# Patient Record
Sex: MALE | Race: BLACK OR AFRICAN AMERICAN | HISPANIC OR LATINO
[De-identification: names, ages, dates, MRNs, and addresses within clinical notes are randomized per-mention and may not be internally consistent; named-entity substitution may affect disease eponyms.]

---

## 2023-02-28 PROBLEM — Z00.00 ENCOUNTER FOR PREVENTIVE HEALTH EXAMINATION: Status: ACTIVE | Noted: 2023-02-28

## 2023-05-05 ENCOUNTER — LABORATORY RESULT (OUTPATIENT)
Age: 43
End: 2023-05-05

## 2023-05-05 ENCOUNTER — APPOINTMENT (OUTPATIENT)
Dept: PULMONOLOGY | Facility: CLINIC | Age: 43
End: 2023-05-05
Payer: MEDICAID

## 2023-05-05 VITALS
BODY MASS INDEX: 26.77 KG/M2 | SYSTOLIC BLOOD PRESSURE: 141 MMHG | DIASTOLIC BLOOD PRESSURE: 92 MMHG | WEIGHT: 187 LBS | HEART RATE: 95 BPM | HEIGHT: 70 IN | OXYGEN SATURATION: 97 %

## 2023-05-05 DIAGNOSIS — G47.9 SLEEP DISORDER, UNSPECIFIED: ICD-10-CM

## 2023-05-05 PROCEDURE — ZZZZZ: CPT

## 2023-05-05 PROCEDURE — 94729 DIFFUSING CAPACITY: CPT

## 2023-05-05 PROCEDURE — 94726 PLETHYSMOGRAPHY LUNG VOLUMES: CPT

## 2023-05-05 PROCEDURE — 94010 BREATHING CAPACITY TEST: CPT

## 2023-05-05 PROCEDURE — 99204 OFFICE O/P NEW MOD 45 MIN: CPT | Mod: 25

## 2023-05-05 RX ORDER — ALBUTEROL SULFATE 90 UG/1
108 (90 BASE) INHALANT RESPIRATORY (INHALATION) EVERY 4 HOURS
Qty: 1 | Refills: 1 | Status: ACTIVE | COMMUNITY
Start: 2023-05-05 | End: 1900-01-01

## 2023-05-05 RX ORDER — CETIRIZINE HYDROCHLORIDE 5 MG/1
5 TABLET ORAL DAILY
Qty: 30 | Refills: 0 | Status: ACTIVE | COMMUNITY
Start: 2023-05-05 | End: 1900-01-01

## 2023-05-05 RX ORDER — FLUTICASONE PROPIONATE 50 UG/1
50 SPRAY, METERED NASAL DAILY
Qty: 1 | Refills: 1 | Status: ACTIVE | COMMUNITY
Start: 2023-05-05 | End: 1900-01-01

## 2023-05-05 NOTE — HISTORY OF PRESENT ILLNESS
[TextBox_4] : 42-year-old man with no significant past medical history presents to establish care.  Mr. Zepeda states that he has noticed progressive dyspnea over the last few months.  He is at baseline fully independent and active.  He lives at home.  He has never before had any respiratory issues.  However he noticed that the last few months or longer he has started having worsening dyspnea on exertion.  He states he becomes significantly short of breath with minimal leg motion.  He can climb 1-2 flights of stairs.  He has unlimited exercise tolerance when walking however as soon as he starts to jog become short of breath.  He does not have any fevers, chills or any signs of infection.  He did not report any chest pain.  He has no cardiac history.  He states he has occasional wheezing.  He endorses nasal congestion and occasional cough.  Of note review of systems was also positive for significant weight gain.  He states he was 140 pounds for most of his life and recently increased up to 190 pounds.  He feels that dyspnea has correlated with this.\par \par He worked as an actor and businessman.  No known workplace toxic exposures.  He is from [place].  He denies a history of smoking.  No hookah no e-cigarettes no vaping.  He has frequent exposures to dogs with his family. \par \par Of note he states he was told he had sleep apnea.  He is not on CPAP at this time.  He endorses snoring and witnessed apneas at home.  He has significant daytime fatigue.  STOP-BANG 4.  ESS 13

## 2023-05-09 LAB
A ALTERNATA IGE QN: <0.1 KUA/L
A FUMIGATUS IGE QN: <0.1 KUA/L
C ALBICANS IGE QN: <0.1 KUA/L
C HERBARUM IGE QN: <0.1 KUA/L
CAT DANDER IGE QN: 4.09 KUA/L
COMMON RAGWEED IGE QN: 0.34 KUA/L
D FARINAE IGE QN: <0.1 KUA/L
D PTERONYSS IGE QN: 0.17 KUA/L
DEPRECATED A ALTERNATA IGE RAST QL: 0
DEPRECATED A FUMIGATUS IGE RAST QL: 0
DEPRECATED C ALBICANS IGE RAST QL: 0
DEPRECATED C HERBARUM IGE RAST QL: 0
DEPRECATED CAT DANDER IGE RAST QL: 3
DEPRECATED COMMON RAGWEED IGE RAST QL: NORMAL
DEPRECATED D FARINAE IGE RAST QL: 0
DEPRECATED D PTERONYSS IGE RAST QL: NORMAL
DEPRECATED DOG DANDER IGE RAST QL: 5
DEPRECATED M RACEMOSUS IGE RAST QL: 0
DEPRECATED ROACH IGE RAST QL: 2
DEPRECATED TIMOTHY IGE RAST QL: 3
DEPRECATED WHITE OAK IGE RAST QL: 1
DOG DANDER IGE QN: 54.5 KUA/L
M RACEMOSUS IGE QN: <0.1 KUA/L
ROACH IGE QN: 1.02 KUA/L
TIMOTHY IGE QN: 4.24 KUA/L
TOTAL IGE SMQN RAST: 617 KU/L
WHITE OAK IGE QN: 0.35 KUA/L

## 2023-05-29 ENCOUNTER — FORM ENCOUNTER (OUTPATIENT)
Age: 43
End: 2023-05-29

## 2023-05-30 ENCOUNTER — APPOINTMENT (OUTPATIENT)
Dept: SLEEP CENTER | Facility: CLINIC | Age: 43
End: 2023-05-30
Payer: MEDICAID

## 2023-05-30 ENCOUNTER — OUTPATIENT (OUTPATIENT)
Dept: OUTPATIENT SERVICES | Facility: HOSPITAL | Age: 43
LOS: 1 days | End: 2023-05-30
Payer: MEDICAID

## 2023-05-30 PROCEDURE — 95800 SLP STDY UNATTENDED: CPT

## 2023-05-30 PROCEDURE — 95800 SLP STDY UNATTENDED: CPT | Mod: 26

## 2023-06-01 DIAGNOSIS — G47.33 OBSTRUCTIVE SLEEP APNEA (ADULT) (PEDIATRIC): ICD-10-CM

## 2023-06-12 ENCOUNTER — APPOINTMENT (OUTPATIENT)
Dept: PULMONOLOGY | Facility: CLINIC | Age: 43
End: 2023-06-12

## 2023-07-14 ENCOUNTER — APPOINTMENT (OUTPATIENT)
Dept: PULMONOLOGY | Facility: CLINIC | Age: 43
End: 2023-07-14
Payer: MEDICAID

## 2023-07-14 PROCEDURE — 99215 OFFICE O/P EST HI 40 MIN: CPT | Mod: 95

## 2023-07-14 NOTE — HISTORY OF PRESENT ILLNESS
[Obstructive Sleep Apnea] : obstructive sleep apnea [Snoring] : snoring [Witnessed Apneas] : witnessed sleep apnea [Frequent Nocturnal Awakening] : frequent nocturnal awakening [Awakes Unrefreshed] : awakening unrefreshed [Awakes with Headache] : headache upon awakening [Awakening With Dry Mouth] : awakening with dry mouth [DMS] : DMS [To Bed: ___] : ~he/she~ goes to bed at [unfilled] [Arises: ___] : arises at [unfilled] [Sleep Onset Latency: ___ minutes] : sleep onset latency of [unfilled] minutes reported [Nocturnal Awakenings: ___] : ~he/she~ typically has [unfilled] nocturnal awakenings [Date: ___] : Date of most recent diagnostic polysomnogram: [unfilled] [AHI: ___ per hour] : Apnea-hypopnea index:  [unfilled] per hour [T90%: ___] : T90%: [unfilled]% [Willie desatuation%: ___] : Willie desaturation:  [unfilled]% [FreeTextEntry1] : This is a 42-year-old male with significant past medical history of seasonal allergies and new onset dyspnea who comes in to establish care with me after undergoing a sleep study.  The patient was referred by Dr. Owusu.\par \par Patient indicates that he has an erratic sleep schedule and is a late night sleeper.  He goes to bed around 1 AM and wakes up at 7 AM.  He does wake up multiple times throughout the night.  At times he has insomnia and he has to take over-the-counter sleeping aids to help him sleep.  When he does this, he has a headache and as a result only does this sparingly.  He does indicate that he feels sleepy when he is not actively doing something.  He has never had any issues while driving.  He does not know of any family members have any sleep apnea.  He does indicate that he has lost about 10 pounds and his sleep quality has improved and his girlfriend indicates that his snoring has also improved.  He is interested in going over his sleep study. [Recent  Weight Gain] : no recent weight gain [DIS] : no DIS [Unusual Sleep Behavior] : no unusual sleep behavior [Cataplexy] : no cataplexy [Sleep Paralysis] : no sleep paralysis [Hypnagogic Hallucinations] : no hallucinations when falling asleep [Hypnopompic Hallucinations] : no hallucinations when awakening [ESS] : 9

## 2023-07-14 NOTE — ASSESSMENT
[FreeTextEntry1] : This is a 42-year-old male with significant past medical history of seasonal allergies and severe obstructive sleep apnea who comes in to [facility] care.\par \par #Obstructive sleep apnea–patient was recently found to have severe obstructive sleep apnea with moderate to severe oxygen desaturation.  Discussed with the patient the medical ramifications of this including heart attack, stroke, Alzheimer's disease, hypertension, and diabetes.  The patient is currently losing weight and encouraged him to lose more weight as this may help with sleep apnea.  However, I discouraged him from not using treatment for his sleep apnea while he loses weight as it is unlikely that he will be able to lose enough weight to eliminate his severe obstructive sleep apnea.  He is amenable to treatment and he will be ordered an APAP device.\par \par Once he receives his APAP device and is stable on it, we will perform an overnight oximetry test with his PAP device to ensure that his oxygen saturation has in fact improved.\par \par Equipment will be ordered through the DME provider. The compliance criteria were discussed and he was instructed to follow up with us within 2-3 months receiving the equipment.\par

## 2023-07-14 NOTE — REVIEW OF SYSTEMS
[EDS: ESS=____] : daytime somnolence: ESS=[unfilled] [Recent Wt Loss (___ Lbs)] : recent [unfilled] ~Ulb weight loss

## 2023-07-14 NOTE — REASON FOR VISIT
[Home] : at home, [unfilled] , at the time of the visit. [Medical Office: ([facility])___] : at the medical office located in  [Initial Evaluation] : an initial evaluation [Sleep Apnea] : sleep apnea

## 2023-10-24 ENCOUNTER — APPOINTMENT (OUTPATIENT)
Dept: PULMONOLOGY | Facility: CLINIC | Age: 43
End: 2023-10-24
Payer: MEDICAID

## 2023-10-24 VITALS
SYSTOLIC BLOOD PRESSURE: 154 MMHG | WEIGHT: 186 LBS | RESPIRATION RATE: 15 BRPM | BODY MASS INDEX: 26.63 KG/M2 | HEIGHT: 70 IN | DIASTOLIC BLOOD PRESSURE: 93 MMHG | HEART RATE: 93 BPM | TEMPERATURE: 97 F | OXYGEN SATURATION: 94 %

## 2023-10-24 DIAGNOSIS — G47.33 OBSTRUCTIVE SLEEP APNEA (ADULT) (PEDIATRIC): ICD-10-CM

## 2023-10-24 DIAGNOSIS — R06.02 SHORTNESS OF BREATH: ICD-10-CM

## 2023-10-24 DIAGNOSIS — J30.2 OTHER SEASONAL ALLERGIC RHINITIS: ICD-10-CM

## 2023-10-24 PROCEDURE — 99213 OFFICE O/P EST LOW 20 MIN: CPT
